# Patient Record
Sex: MALE | Race: WHITE | NOT HISPANIC OR LATINO | Employment: FULL TIME | URBAN - METROPOLITAN AREA
[De-identification: names, ages, dates, MRNs, and addresses within clinical notes are randomized per-mention and may not be internally consistent; named-entity substitution may affect disease eponyms.]

---

## 2017-11-10 ENCOUNTER — OFFICE VISIT (OUTPATIENT)
Dept: SURGERY | Facility: CLINIC | Age: 47
End: 2017-11-10

## 2017-11-10 VITALS
HEART RATE: 59 BPM | OXYGEN SATURATION: 99 % | WEIGHT: 182 LBS | BODY MASS INDEX: 24.65 KG/M2 | SYSTOLIC BLOOD PRESSURE: 130 MMHG | HEIGHT: 72 IN | DIASTOLIC BLOOD PRESSURE: 84 MMHG | TEMPERATURE: 97.8 F

## 2017-11-10 DIAGNOSIS — L29.0 ANAL ITCHING: Primary | ICD-10-CM

## 2017-11-10 DIAGNOSIS — K62.89 PERIRECTAL SKIN IRRITATION: ICD-10-CM

## 2017-11-10 PROCEDURE — 99204 OFFICE O/P NEW MOD 45 MIN: CPT | Performed by: COLON & RECTAL SURGERY

## 2017-11-10 PROCEDURE — 46600 DIAGNOSTIC ANOSCOPY SPX: CPT | Performed by: COLON & RECTAL SURGERY

## 2017-11-10 RX ORDER — TRIAMCINOLONE ACETONIDE 5 MG/G
OINTMENT TOPICAL 2 TIMES DAILY
Qty: 15 G | Refills: 1 | Status: SHIPPED | OUTPATIENT
Start: 2017-11-10

## 2017-11-10 NOTE — PROGRESS NOTES
Boni Brennan is a 47 y.o. male who is seen as a consult for Itching.      HPI:    Pt c/o anorectal itching for several months    Worse with increased exercising: bicycling over the summer    Used ointment (?mupirocin?), which helped with itching, but did not resolve    Did not use PrepH    He has noted blood about once per week  Sees less when using cream    Most recently noted blood this week    BMs usually regular: he goes 1-2 times per day    He c/o hx extreme lactose intolerance  Controls with diet  Occ abdominal bloating    FamHx: no known hx colon polyps or colon cancer.  Ovarian cancer mother    Past Medical History:   Diagnosis Date   • IBS (irritable bowel syndrome)    • Irritable bowel    • Seasonal allergies        History reviewed. No pertinent surgical history.    Social History:   reports that he has never smoked. He does not have any smokeless tobacco history on file. He reports that he drinks alcohol.      Marriage status:     Family History   Problem Relation Age of Onset   • Ovarian cancer Mother    • Diabetes Father    • Heart attack Father          Current Outpatient Prescriptions:   •  cetirizine (ZyrTEC) 10 MG tablet, Take 10 mg by mouth daily., Disp: , Rfl:     Allergy  Review of patient's allergies indicates no known allergies.    Review of Systems   Constitution: Negative for decreased appetite, weakness and weight gain.   HENT: Negative for congestion, hearing loss and hoarse voice.    Eyes: Negative for blurred vision, discharge and visual disturbance.   Cardiovascular: Negative for chest pain, cyanosis and leg swelling.   Respiratory: Positive for snoring. Negative for cough, shortness of breath and sleep disturbances due to breathing.    Endocrine: Positive for cold intolerance. Negative for heat intolerance.   Hematologic/Lymphatic: Does not bruise/bleed easily.   Skin: Negative for itching, poor wound healing and skin cancer.   Musculoskeletal: Negative for arthritis, back  pain, joint pain and joint swelling.   Gastrointestinal: Positive for constipation. Negative for abdominal pain, change in bowel habit and bowel incontinence.   Genitourinary: Negative for bladder incontinence, dysuria and hematuria.   Neurological: Negative for brief paralysis, excessive daytime sleepiness, dizziness, focal weakness and light-headedness.   Psychiatric/Behavioral: Negative for altered mental status and hallucinations. The patient does not have insomnia.    Allergic/Immunologic: Negative for HIV exposure and persistent infections.       Vitals:    11/10/17 1351   BP: 130/84   Pulse: 59   Temp: 97.8 °F (36.6 °C)   SpO2: 99%     Body mass index is 24.68 kg/(m^2).    Physical Exam   Constitutional: He is oriented to person, place, and time. He appears well-developed and well-nourished. No distress.   HENT:   Head: Normocephalic and atraumatic.   Nose: Nose normal.   Mouth/Throat: Oropharynx is clear and moist.   Eyes: Conjunctivae and EOM are normal. Pupils are equal, round, and reactive to light.   Neck: Normal range of motion. No tracheal deviation present.   Pulmonary/Chest: Effort normal and breath sounds normal. No respiratory distress.   Abdominal: Soft. Bowel sounds are normal. He exhibits no distension.   Genitourinary:   Genitourinary Comments: Perianal exam: external hem - wnl.  Mild perianal skin excoriation anterior  YOSELIN- good tone, no masses  Anoscopy performed: wnl internal hem   Musculoskeletal: Normal range of motion. He exhibits no edema or deformity.   Neurological: He is alert and oriented to person, place, and time. No cranial nerve deficit. Coordination and gait normal.   Skin: Skin is warm and dry.   Psychiatric: He has a normal mood and affect. His behavior is normal. Judgment normal.       Assessment:  1. Anal itching    2. Perirectal skin irritation        Plan:    Extensive discussion with patient that exact cause of anal itching is usually multifactorial, and can be very  difficult to pin down. Gave pruritis ani handout with recommendations. She should try exclusion diet with potential irritating agents such as sodas, alcohol, and coffee. Avoid vigorous wiping. Avoid products with alcohols and witch hazel, as these can be drying. Begin fiber supplement to help optimize stool consistency. Should try cotton underwear. Discussed possibility of allergy testing through dermatology in the future if no improvement with conservative medical management.     Rx triamcinolone with instructions given: bid x1 week, then qd x1 week, then d/c    Also gave samples calmoseptine with instructions    Call or come in if no improvement, if any questions, or concerning symptoms      RTC 4 weeks      Scribed for Mary Rivas MD by Sruthi Chavez PA-C 11/10/2017  This patient was evaluated by me, recommendations made, documentation reviewed, edited, and revised by me, Mary Rivas MD